# Patient Record
Sex: MALE | Race: WHITE | NOT HISPANIC OR LATINO
[De-identification: names, ages, dates, MRNs, and addresses within clinical notes are randomized per-mention and may not be internally consistent; named-entity substitution may affect disease eponyms.]

---

## 2019-01-16 ENCOUNTER — APPOINTMENT (OUTPATIENT)
Dept: INTERNAL MEDICINE | Facility: CLINIC | Age: 44
End: 2019-01-16

## 2019-06-15 ENCOUNTER — APPOINTMENT (OUTPATIENT)
Dept: FAMILY MEDICINE | Facility: CLINIC | Age: 44
End: 2019-06-15
Payer: COMMERCIAL

## 2019-06-15 ENCOUNTER — LABORATORY RESULT (OUTPATIENT)
Age: 44
End: 2019-06-15

## 2019-06-15 ENCOUNTER — NON-APPOINTMENT (OUTPATIENT)
Age: 44
End: 2019-06-15

## 2019-06-15 VITALS
DIASTOLIC BLOOD PRESSURE: 80 MMHG | SYSTOLIC BLOOD PRESSURE: 116 MMHG | HEART RATE: 72 BPM | WEIGHT: 205 LBS | BODY MASS INDEX: 26.31 KG/M2 | HEIGHT: 74 IN

## 2019-06-15 DIAGNOSIS — Z82.49 FAMILY HISTORY OF ISCHEMIC HEART DISEASE AND OTHER DISEASES OF THE CIRCULATORY SYSTEM: ICD-10-CM

## 2019-06-15 DIAGNOSIS — Z83.3 FAMILY HISTORY OF DIABETES MELLITUS: ICD-10-CM

## 2019-06-15 DIAGNOSIS — Z23 ENCOUNTER FOR IMMUNIZATION: ICD-10-CM

## 2019-06-15 DIAGNOSIS — Z86.79 PERSONAL HISTORY OF OTHER DISEASES OF THE CIRCULATORY SYSTEM: ICD-10-CM

## 2019-06-15 DIAGNOSIS — Z81.8 FAMILY HISTORY OF OTHER MENTAL AND BEHAVIORAL DISORDERS: ICD-10-CM

## 2019-06-15 DIAGNOSIS — Z83.438 FAMILY HISTORY OF OTHER DISORDER OF LIPOPROTEIN METABOLISM AND OTHER LIPIDEMIA: ICD-10-CM

## 2019-06-15 DIAGNOSIS — Z80.42 FAMILY HISTORY OF MALIGNANT NEOPLASM OF PROSTATE: ICD-10-CM

## 2019-06-15 DIAGNOSIS — Z81.1 FAMILY HISTORY OF ALCOHOL ABUSE AND DEPENDENCE: ICD-10-CM

## 2019-06-15 LAB
BILIRUB UR QL STRIP: NEGATIVE
CLARITY UR: CLEAR
GLUCOSE UR-MCNC: NEGATIVE
HCG UR QL: 0.2 EU/DL
HGB UR QL STRIP.AUTO: NEGATIVE
KETONES UR-MCNC: NEGATIVE
LEUKOCYTE ESTERASE UR QL STRIP: NEGATIVE
NITRITE UR QL STRIP: NEGATIVE
PH UR STRIP: 6
PROT UR STRIP-MCNC: NEGATIVE
SP GR UR STRIP: 1.01

## 2019-06-15 PROCEDURE — 99173 VISUAL ACUITY SCREEN: CPT

## 2019-06-15 PROCEDURE — 99386 PREV VISIT NEW AGE 40-64: CPT | Mod: 25

## 2019-06-15 PROCEDURE — 92551 PURE TONE HEARING TEST AIR: CPT

## 2019-06-15 PROCEDURE — 81002 URINALYSIS NONAUTO W/O SCOPE: CPT

## 2019-06-15 PROCEDURE — 93000 ELECTROCARDIOGRAM COMPLETE: CPT

## 2019-06-15 PROCEDURE — 90715 TDAP VACCINE 7 YRS/> IM: CPT

## 2019-06-15 PROCEDURE — 90471 IMMUNIZATION ADMIN: CPT

## 2019-06-15 PROCEDURE — 36415 COLL VENOUS BLD VENIPUNCTURE: CPT

## 2019-06-15 NOTE — PHYSICAL EXAM
[20/___] : left eye 20/[unfilled] [No Acute Distress] : no acute distress [Well Nourished] : well nourished [Well Developed] : well developed [Well-Appearing] : well-appearing [EOMI] : extraocular movements intact [Normal Sclera/Conjunctiva] : normal sclera/conjunctiva [PERRL] : pupils equal round and reactive to light [Normal Outer Ear/Nose] : the outer ears and nose were normal in appearance [Normal Oropharynx] : the oropharynx was normal [Supple] : supple [No JVD] : no jugular venous distention [Thyroid Normal, No Nodules] : the thyroid was normal and there were no nodules present [No Lymphadenopathy] : no lymphadenopathy [No Respiratory Distress] : no respiratory distress  [Clear to Auscultation] : lungs were clear to auscultation bilaterally [No Accessory Muscle Use] : no accessory muscle use [Regular Rhythm] : with a regular rhythm [Normal Rate] : normal rate  [Normal S1, S2] : normal S1 and S2 [No Murmur] : no murmur heard [No Carotid Bruits] : no carotid bruits [No Abdominal Bruit] : a ~M bruit was not heard ~T in the abdomen [No Varicosities] : no varicosities [Pedal Pulses Present] : the pedal pulses are present [No Extremity Clubbing/Cyanosis] : no extremity clubbing/cyanosis [No Edema] : there was no peripheral edema [Soft] : abdomen soft [No Palpable Aorta] : no palpable aorta [No Masses] : no abdominal mass palpated [Non-distended] : non-distended [No HSM] : no HSM [Non Tender] : non-tender [Normal Posterior Cervical Nodes] : no posterior cervical lymphadenopathy [Normal Anterior Cervical Nodes] : no anterior cervical lymphadenopathy [Normal Bowel Sounds] : normal bowel sounds [No Joint Swelling] : no joint swelling [No CVA Tenderness] : no CVA  tenderness [No Spinal Tenderness] : no spinal tenderness [Normal Gait] : normal gait [Grossly Normal Strength/Tone] : grossly normal strength/tone [No Rash] : no rash [Coordination Grossly Intact] : coordination grossly intact [No Focal Deficits] : no focal deficits [Normal Affect] : the affect was normal [Deep Tendon Reflexes (DTR)] : deep tendon reflexes were 2+ and symmetric [Normal Insight/Judgement] : insight and judgment were intact [de-identified] : 500 L 25 R 20  1000 R 20 L20   2000 R20  L20   4000  R15 L15

## 2019-06-15 NOTE — COUNSELING
[Weight management counseling provided] : Weight management [Healthy eating counseling provided] : healthy eating [Activity counseling provided] : activity [Behavioral health counseling provided] : behavioral health  [None] : None [Good understanding] : Patient has a good understanding of lifestyle changes and the steps needed to achieve self management goals [Fall prevention counseling provided] : fall prevention

## 2019-06-15 NOTE — HEALTH RISK ASSESSMENT
[Very Good] : ~his/her~ current health as very good [] : No [No falls in past year] : Patient reported no falls in the past year [0] : 2) Feeling down, depressed, or hopeless: Not at all (0) [HIV test declined] : HIV test declined [Change in mental status noted] : No change in mental status noted [Hepatitis C test declined] : Hepatitis C test declined [None] : None [With Family] : lives with family [Employed] : employed [# Of Children ___] : has [unfilled] children [] :  [Graduate School] : graduate school [Sexually Active] : sexually active [High Risk Behavior] : no high risk behavior [Feels Safe at Home] : Feels safe at home [Fully functional (bathing, dressing, toileting, transferring, walking, feeding)] : Fully functional (bathing, dressing, toileting, transferring, walking, feeding) [Reports changes in vision] : Reports no changes in vision [Reports changes in hearing] : Reports no changes in hearing [Fully functional (using the telephone, shopping, preparing meals, housekeeping, doing laundry, using] : Fully functional and needs no help or supervision to perform IADLs (using the telephone, shopping, preparing meals, housekeeping, doing laundry, using transportation, managing medications and managing finances) [Carbon Monoxide Detector] : carbon monoxide detector [Reports changes in dental health] : Reports no changes in dental health [Smoke Detector] : smoke detector [Guns at Home] : guns at home [Safety elements used in home] : safety elements used in home [Seat Belt] :  uses seat belt [Sunscreen] : uses sunscreen [de-identified] : locked

## 2019-06-16 LAB
ALBUMIN SERPL ELPH-MCNC: 4.6 G/DL
ALP BLD-CCNC: 58 U/L
ALT SERPL-CCNC: 25 U/L
ANION GAP SERPL CALC-SCNC: 15 MMOL/L
AST SERPL-CCNC: 20 U/L
BASOPHILS # BLD AUTO: 0.03 K/UL
BASOPHILS NFR BLD AUTO: 0.6 %
BILIRUB SERPL-MCNC: 1 MG/DL
BUN SERPL-MCNC: 16 MG/DL
CALCIUM SERPL-MCNC: 9.6 MG/DL
CHLORIDE SERPL-SCNC: 105 MMOL/L
CHOLEST SERPL-MCNC: 171 MG/DL
CHOLEST/HDLC SERPL: 3.4 RATIO
CO2 SERPL-SCNC: 22 MMOL/L
CREAT SERPL-MCNC: 1.05 MG/DL
EOSINOPHIL # BLD AUTO: 0.09 K/UL
EOSINOPHIL NFR BLD AUTO: 1.7 %
GLUCOSE SERPL-MCNC: 98 MG/DL
HCT VFR BLD CALC: 47.2 %
HDLC SERPL-MCNC: 50 MG/DL
HGB BLD-MCNC: 15.7 G/DL
IMM GRANULOCYTES NFR BLD AUTO: 0.4 %
LDLC SERPL CALC-MCNC: 107 MG/DL
LYMPHOCYTES # BLD AUTO: 1.64 K/UL
LYMPHOCYTES NFR BLD AUTO: 31.8 %
MAN DIFF?: NORMAL
MCHC RBC-ENTMCNC: 30.7 PG
MCHC RBC-ENTMCNC: 33.3 GM/DL
MCV RBC AUTO: 92.4 FL
MONOCYTES # BLD AUTO: 0.39 K/UL
MONOCYTES NFR BLD AUTO: 7.6 %
NEUTROPHILS # BLD AUTO: 2.99 K/UL
NEUTROPHILS NFR BLD AUTO: 57.9 %
PLATELET # BLD AUTO: 216 K/UL
POTASSIUM SERPL-SCNC: 5.3 MMOL/L
PROT SERPL-MCNC: 6.9 G/DL
RBC # BLD: 5.11 M/UL
RBC # FLD: 12.3 %
SODIUM SERPL-SCNC: 142 MMOL/L
T3FREE SERPL-MCNC: 2.68 PG/ML
T4 FREE SERPL-MCNC: 1.2 NG/DL
TRIGL SERPL-MCNC: 71 MG/DL
TSH SERPL-ACNC: 1.06 UIU/ML
WBC # FLD AUTO: 5.16 K/UL

## 2019-10-28 ENCOUNTER — RX RENEWAL (OUTPATIENT)
Age: 44
End: 2019-10-28

## 2020-02-16 ENCOUNTER — RX RENEWAL (OUTPATIENT)
Age: 45
End: 2020-02-16

## 2020-07-08 ENCOUNTER — EMERGENCY (EMERGENCY)
Facility: HOSPITAL | Age: 45
LOS: 0 days | Discharge: HOME | End: 2020-07-08
Payer: COMMERCIAL

## 2020-07-08 VITALS
TEMPERATURE: 99 F | OXYGEN SATURATION: 99 % | HEART RATE: 94 BPM | DIASTOLIC BLOOD PRESSURE: 84 MMHG | SYSTOLIC BLOOD PRESSURE: 130 MMHG | RESPIRATION RATE: 19 BRPM

## 2020-07-08 DIAGNOSIS — Z95.0 PRESENCE OF CARDIAC PACEMAKER: ICD-10-CM

## 2020-07-08 DIAGNOSIS — Z11.59 ENCOUNTER FOR SCREENING FOR OTHER VIRAL DISEASES: ICD-10-CM

## 2020-07-08 PROCEDURE — 99283 EMERGENCY DEPT VISIT LOW MDM: CPT

## 2020-07-08 PROCEDURE — U0003: CPT

## 2020-07-08 NOTE — ED STATDOCS - OBJECTIVE STATEMENT
44 yo male with a PMH of PPM presents for covid testing. Pt states his parents are coming over and his daughter was sick last week with a fever. Pt currently asymptomatic.

## 2020-07-08 NOTE — ED STATDOCS - PATIENT PORTAL LINK FT
You can access the FollowMyHealth Patient Portal offered by [facility] by registering at the following website: [email or web address]. By joining [employer]’s FollowMyHealth portal, you will also be able to view your health information using other applications (apps) compatible with our system.

## 2020-07-09 LAB — SARS-COV-2 RNA SPEC QL NAA+PROBE: SIGNIFICANT CHANGE UP

## 2020-07-18 ENCOUNTER — EMERGENCY (EMERGENCY)
Facility: HOSPITAL | Age: 45
LOS: 0 days | Discharge: HOME | End: 2020-07-18
Payer: COMMERCIAL

## 2020-07-18 VITALS
OXYGEN SATURATION: 98 % | RESPIRATION RATE: 13 BRPM | DIASTOLIC BLOOD PRESSURE: 69 MMHG | SYSTOLIC BLOOD PRESSURE: 117 MMHG | TEMPERATURE: 99 F | HEART RATE: 52 BPM

## 2020-07-18 DIAGNOSIS — J02.9 ACUTE PHARYNGITIS, UNSPECIFIED: ICD-10-CM

## 2020-07-18 PROCEDURE — 99283 EMERGENCY DEPT VISIT LOW MDM: CPT

## 2020-07-18 PROCEDURE — U0003: CPT

## 2020-07-18 NOTE — ED STATDOCS - PATIENT PORTAL LINK FT
You can access the FollowMyHealth Patient Portal offered by [facility] by registering at the following website: [email or web address]. By joining [employer]’s FollowMyHealth portal, you will also be able to view your health information using other applications (apps) compatible with our system.

## 2020-07-18 NOTE — ED STATDOCS - NSFOLLOWUPINSTRUCTIONS_ED_ALL_ED_FT
Pt here for COVID-19 testing. Pt non toxic. Pt was swabbed for COVID-19 in their car in drive through area. [facility] [employer] will call pt with results. return precautions given. Home self-quarantine recommended. Pt agrees with plan of  care.

## 2020-07-19 LAB — SARS-COV-2 RNA SPEC QL NAA+PROBE: SIGNIFICANT CHANGE UP

## 2020-09-14 ENCOUNTER — EMERGENCY (EMERGENCY)
Facility: HOSPITAL | Age: 45
LOS: 0 days | Discharge: HOME | End: 2020-09-14
Payer: COMMERCIAL

## 2020-09-14 VITALS
SYSTOLIC BLOOD PRESSURE: 120 MMHG | OXYGEN SATURATION: 99 % | HEART RATE: 50 BPM | RESPIRATION RATE: 15 BRPM | DIASTOLIC BLOOD PRESSURE: 73 MMHG | TEMPERATURE: 98 F

## 2020-09-14 DIAGNOSIS — Z20.828 CONTACT WITH AND (SUSPECTED) EXPOSURE TO OTHER VIRAL COMMUNICABLE DISEASES: ICD-10-CM

## 2020-09-14 PROCEDURE — 99283 EMERGENCY DEPT VISIT LOW MDM: CPT

## 2020-09-14 PROCEDURE — U0003: CPT

## 2020-09-14 NOTE — ED STATDOCS - PROGRESS NOTE DETAILS
How to get your Coronavirus (COVID-19) Testing Results:   Please be advised that you were tested for the coronavirus (COVID-19) in the Emergency Department at [facility].  You are to maintain self-quarantine procedures for 14 days until instructed otherwise by one of our healthcare agents. Please note that the test may take up to 2-4 days to result.  If you do not hear from us within 72 hours and you'd like to check on your results, you can call on of our coronavirus specialists at [address] [place] [address] (available 24/7).  Please DO NOT call the site where you received the test to obtain your results. ~Pancho Encarnacion PA-C

## 2020-09-14 NOTE — ED STATDOCS - PHYSICAL EXAMINATION
PA note: Constitutional: NAD AAOx3  Eyes: EOMI, pupils equal  Head: Normocephalic, atraumatic  ENT: Throat is clear without erythema. No exudates. Airway is patent.  Mouth: no airway obstruction.   Cardiac: S1 S2. regular rate   Resp: Non-labored breathing, no tachypnea. Lungs CTA b/l. No w/r/r. Equal chest expansion and rise. O2sat 100% RA  GI: Abd soft. NT/ND.   Neuro: CN2-12 intact. No focal deficits.   Skin: No rashes  ~Pancho Encarnacion PA-C

## 2020-09-14 NOTE — ED STATDOCS - OBJECTIVE STATEMENT
Patient presents to [facility] for screening for COVID-19 for traveling purposes. Patient has no acute S&S of fever, chills, cough, SOB, DEVRIES, n/v/d. Patient may have been exposed to COVID-19. ~Pancho Encarnacion PA-C.

## 2020-09-14 NOTE — ED STATDOCS - CLINICAL SUMMARY MEDICAL DECISION MAKING FREE TEXT BOX
Patient presents with concerns for COVID exposure.  As patient is nontoxic appearing, will test for COVID and d/c.  Quarantine reviewed and return precautions reviewed. ~Pancho Encarnacion PA-C

## 2020-09-14 NOTE — ED STATDOCS - NS ED ROS FT
ROS: Constitutional- DENIES fever, chills.  Respiratory- DENIES cough, SOB  Cardiac- no chest pain, no palpitations, ENT- DENIES rhinorrhea, no sore throat, no congestion. DENIES loss of sense of smell or taste. Abdomen- No nausea, no vomiting, no diarrhea.  Urinary- no dysuria, no urgency, no frequency.  Skin- No rashes ~Pancho Encarnacion PA-C

## 2020-09-14 NOTE — ED STATDOCS - PATIENT PORTAL LINK FT
You can access the FollowMyHealth Patient Portal offered by [facility] by registering at the following website: [email or web address]. By joining [employer]’s FollowMyHealth portal, you will also be able to view your health information using other applications (apps) compatible with our system.

## 2020-09-15 ENCOUNTER — TRANSCRIPTION ENCOUNTER (OUTPATIENT)
Age: 45
End: 2020-09-15

## 2020-09-15 LAB — SARS-COV-2 RNA SPEC QL NAA+PROBE: SIGNIFICANT CHANGE UP

## 2020-09-18 ENCOUNTER — RX RENEWAL (OUTPATIENT)
Age: 45
End: 2020-09-18

## 2020-11-23 ENCOUNTER — LABORATORY RESULT (OUTPATIENT)
Age: 45
End: 2020-11-23

## 2020-11-30 ENCOUNTER — APPOINTMENT (OUTPATIENT)
Dept: FAMILY MEDICINE | Facility: CLINIC | Age: 45
End: 2020-11-30
Payer: COMMERCIAL

## 2020-11-30 ENCOUNTER — NON-APPOINTMENT (OUTPATIENT)
Age: 45
End: 2020-11-30

## 2020-11-30 VITALS
TEMPERATURE: 98 F | BODY MASS INDEX: 26.95 KG/M2 | SYSTOLIC BLOOD PRESSURE: 110 MMHG | HEIGHT: 74 IN | HEART RATE: 43 BPM | DIASTOLIC BLOOD PRESSURE: 64 MMHG | WEIGHT: 210 LBS | OXYGEN SATURATION: 98 %

## 2020-11-30 DIAGNOSIS — Z23 ENCOUNTER FOR IMMUNIZATION: ICD-10-CM

## 2020-11-30 DIAGNOSIS — Z11.59 ENCOUNTER FOR SCREENING FOR OTHER VIRAL DISEASES: ICD-10-CM

## 2020-11-30 LAB
BILIRUB UR QL STRIP: NORMAL
CLARITY UR: CLEAR
COLLECTION METHOD: NORMAL
GLUCOSE UR-MCNC: NORMAL
HCG UR QL: 0.2 EU/DL
HGB UR QL STRIP.AUTO: NORMAL
KETONES UR-MCNC: NORMAL
LEUKOCYTE ESTERASE UR QL STRIP: NORMAL
NITRITE UR QL STRIP: NORMAL
PH UR STRIP: 6
PROT UR STRIP-MCNC: NORMAL
SP GR UR STRIP: 1.01

## 2020-11-30 PROCEDURE — G0008: CPT

## 2020-11-30 PROCEDURE — 81003 URINALYSIS AUTO W/O SCOPE: CPT | Mod: QW

## 2020-11-30 PROCEDURE — 99396 PREV VISIT EST AGE 40-64: CPT | Mod: 25

## 2020-11-30 PROCEDURE — 99072 ADDL SUPL MATRL&STAF TM PHE: CPT

## 2020-11-30 PROCEDURE — 36415 COLL VENOUS BLD VENIPUNCTURE: CPT

## 2020-11-30 PROCEDURE — 90686 IIV4 VACC NO PRSV 0.5 ML IM: CPT

## 2020-11-30 NOTE — HEALTH RISK ASSESSMENT
[Yes] : Yes [2 - 3 times a week (3 pts)] : 2 - 3  times a week (3 points) [1 or 2 (0 pts)] : 1 or 2 (0 points) [No] : In the past 12 months have you used drugs other than those required for medical reasons? No [0] : 2) Feeling down, depressed, or hopeless: Not at all (0) [Very Good] : ~his/her~  mood as very good [] : No [Never (0 pts)] : Never (0 points) [No falls in past year] : Patient reported no falls in the past year [Audit-CScore] : 3 [[id number]] : 0 [HIV test declined] : HIV test declined [Hepatitis C test declined] : Hepatitis C test declined [Change in mental status noted] : No change in mental status noted [Language] : denies difficulty with language [Behavior] : denies difficulty with behavior [Learning/Retaining New Information] : denies difficulty learning/retaining new information [Handling Complex Tasks] : denies difficulty handling complex tasks [Reasoning] : denies difficulty with reasoning [Spatial Ability and Orientation] : denies difficulty with spatial ability and orientation [None] : None [Alone] : lives alone [Employed] : employed [Graduate School] : graduate school [] :  [# Of Children ___] : has [unfilled] children [Sexually Active] : sexually active [High Risk Behavior] : no high risk behavior [Feels Safe at Home] : Feels safe at home [Fully functional (bathing, dressing, toileting, transferring, walking, feeding)] : Fully functional (bathing, dressing, toileting, transferring, walking, feeding) [Fully functional (using the telephone, shopping, preparing meals, housekeeping, doing laundry, using] : Fully functional and needs no help or supervision to perform IADLs (using the telephone, shopping, preparing meals, housekeeping, doing laundry, using transportation, managing medications and managing finances) [Reports changes in hearing] : Reports no changes in hearing [Reports changes in vision] : Reports no changes in vision [Reports changes in dental health] : Reports no changes in dental health [Smoke Detector] : smoke detector [Carbon Monoxide Detector] : carbon monoxide detector [Guns at Home] : no guns at home [Seat Belt] :  uses seat belt [Sunscreen] : uses sunscreen

## 2020-11-30 NOTE — PHYSICAL EXAM
[20/___] : left eye 20/[unfilled] [Normal] : soft, non-tender, non-distended, no masses palpated, no HSM and normal bowel sounds [FreeTextEntry1] : Right Ear\par \par 0.5-40\par 1-25\par 2-15\par 4-15\par \par Left Ear\par \par 0.5-30\par 1-15\par 2-15\par 4-15

## 2020-12-02 ENCOUNTER — TRANSCRIPTION ENCOUNTER (OUTPATIENT)
Age: 45
End: 2020-12-02

## 2020-12-02 LAB
SARS-COV-2 IGG SERPL IA-ACNC: 135 INDEX
SARS-COV-2 IGG SERPL QL IA: POSITIVE

## 2021-01-13 ENCOUNTER — RX RENEWAL (OUTPATIENT)
Age: 46
End: 2021-01-13

## 2021-03-01 ENCOUNTER — APPOINTMENT (OUTPATIENT)
Dept: FAMILY MEDICINE | Facility: CLINIC | Age: 46
End: 2021-03-01

## 2021-03-23 ENCOUNTER — NON-APPOINTMENT (OUTPATIENT)
Age: 46
End: 2021-03-23

## 2021-07-09 ENCOUNTER — RX RENEWAL (OUTPATIENT)
Age: 46
End: 2021-07-09

## 2021-07-09 RX ORDER — ASPIRIN 81 MG/1
81 TABLET, COATED ORAL
Qty: 100 | Refills: 0 | Status: ACTIVE | COMMUNITY
Start: 2020-09-18 | End: 1900-01-01

## 2021-08-11 ENCOUNTER — NON-APPOINTMENT (OUTPATIENT)
Age: 46
End: 2021-08-11

## 2021-08-11 ENCOUNTER — APPOINTMENT (OUTPATIENT)
Dept: FAMILY MEDICINE | Facility: CLINIC | Age: 46
End: 2021-08-11
Payer: COMMERCIAL

## 2021-08-11 VITALS
TEMPERATURE: 97.9 F | HEIGHT: 74 IN | OXYGEN SATURATION: 98 % | BODY MASS INDEX: 26.95 KG/M2 | SYSTOLIC BLOOD PRESSURE: 153 MMHG | WEIGHT: 210 LBS | DIASTOLIC BLOOD PRESSURE: 84 MMHG | HEART RATE: 50 BPM

## 2021-08-11 DIAGNOSIS — Z95.0 PRESENCE OF CARDIAC PACEMAKER: ICD-10-CM

## 2021-08-11 PROCEDURE — 99213 OFFICE O/P EST LOW 20 MIN: CPT | Mod: 25

## 2021-08-11 PROCEDURE — 93000 ELECTROCARDIOGRAM COMPLETE: CPT

## 2021-08-11 RX ORDER — METOPROLOL SUCCINATE 25 MG/1
25 TABLET, EXTENDED RELEASE ORAL DAILY
Qty: 90 | Refills: 0 | Status: ACTIVE | COMMUNITY
Start: 2019-06-15

## 2021-08-11 NOTE — COUNSELING
[Fall prevention counseling provided] : Fall prevention counseling provided [Behavioral health counseling provided] : Behavioral health counseling provided English

## 2021-08-11 NOTE — HEALTH RISK ASSESSMENT
[Yes] : Yes [Monthly or less (1 pt)] : Monthly or less (1 point) [1 or 2 (0 pts)] : 1 or 2 (0 points) [Never (0 pts)] : Never (0 points) [No] : In the past 12 months have you used drugs other than those required for medical reasons? No [No falls in past year] : Patient reported no falls in the past year [0] : 2) Feeling down, depressed, or hopeless: Not at all (0) [] : No [Audit-CScore] : 1 [[id number]] : 0

## 2021-12-03 ENCOUNTER — APPOINTMENT (OUTPATIENT)
Dept: FAMILY MEDICINE | Facility: CLINIC | Age: 46
End: 2021-12-03

## 2022-04-11 PROBLEM — Z11.59 SCREENING FOR VIRAL DISEASE: Status: ACTIVE | Noted: 2020-11-30

## 2022-11-19 ENCOUNTER — APPOINTMENT (OUTPATIENT)
Dept: FAMILY MEDICINE | Facility: CLINIC | Age: 47
End: 2022-11-19

## 2022-11-19 ENCOUNTER — NON-APPOINTMENT (OUTPATIENT)
Age: 47
End: 2022-11-19

## 2022-11-19 VITALS
WEIGHT: 204 LBS | SYSTOLIC BLOOD PRESSURE: 110 MMHG | OXYGEN SATURATION: 98 % | HEART RATE: 51 BPM | BODY MASS INDEX: 26.18 KG/M2 | HEIGHT: 74 IN | TEMPERATURE: 97.7 F | DIASTOLIC BLOOD PRESSURE: 80 MMHG

## 2022-11-19 DIAGNOSIS — I48.91 UNSPECIFIED ATRIAL FIBRILLATION: ICD-10-CM

## 2022-11-19 DIAGNOSIS — Z00.00 ENCOUNTER FOR GENERAL ADULT MEDICAL EXAMINATION W/OUT ABNORMAL FINDINGS: ICD-10-CM

## 2022-11-19 LAB
BILIRUB UR QL STRIP: NORMAL
CLARITY UR: CLEAR
COLLECTION METHOD: NORMAL
GLUCOSE UR-MCNC: NORMAL
HCG UR QL: 0.2 EU/DL
HGB UR QL STRIP.AUTO: NORMAL
KETONES UR-MCNC: NORMAL
LEUKOCYTE ESTERASE UR QL STRIP: NORMAL
NITRITE UR QL STRIP: NORMAL
PH UR STRIP: 6
PROT UR STRIP-MCNC: NORMAL
SP GR UR STRIP: 1.02

## 2022-11-19 PROCEDURE — 92551 PURE TONE HEARING TEST AIR: CPT

## 2022-11-19 PROCEDURE — 99396 PREV VISIT EST AGE 40-64: CPT | Mod: 25

## 2022-11-19 PROCEDURE — 36415 COLL VENOUS BLD VENIPUNCTURE: CPT

## 2022-11-19 PROCEDURE — 81003 URINALYSIS AUTO W/O SCOPE: CPT | Mod: QW

## 2022-11-19 PROCEDURE — 99173 VISUAL ACUITY SCREEN: CPT

## 2022-11-19 PROCEDURE — 93000 ELECTROCARDIOGRAM COMPLETE: CPT

## 2022-11-19 RX ORDER — ASPIRIN ENTERIC COATED TABLETS 81 MG 81 MG/1
81 TABLET, DELAYED RELEASE ORAL
Qty: 100 | Refills: 0 | Status: DISCONTINUED | COMMUNITY
Start: 2019-10-28 | End: 2022-11-19

## 2022-11-19 NOTE — HEALTH RISK ASSESSMENT
[Never] : Never [Yes] : Yes [2 - 3 times a week (3 pts)] : 2 - 3  times a week (3 points) [1 or 2 (0 pts)] : 1 or 2 (0 points) [Never (0 pts)] : Never (0 points) [No] : In the past 12 months have you used drugs other than those required for medical reasons? No [No falls in past year] : Patient reported no falls in the past year [0] : 2) Feeling down, depressed, or hopeless: Not at all (0) [PHQ-2 Negative - No further assessment needed] : PHQ-2 Negative - No further assessment needed [HIV test declined] : HIV test declined [Hepatitis C test declined] : Hepatitis C test declined [With Family] : lives with family [# of Members in Household ___] :  household currently consist of [unfilled] member(s) [Employed] : employed [Graduate School] : graduate school [# Of Children ___] : has [unfilled] children [] :  [Feels Safe at Home] : Feels safe at home [Sexually Active] : sexually active [Fully functional (bathing, dressing, toileting, transferring, walking, feeding)] : Fully functional (bathing, dressing, toileting, transferring, walking, feeding) [Fully functional (using the telephone, shopping, preparing meals, housekeeping, doing laundry, using] : Fully functional and needs no help or supervision to perform IADLs (using the telephone, shopping, preparing meals, housekeeping, doing laundry, using transportation, managing medications and managing finances) [Reports changes in vision] : Reports changes in vision [Reports normal functional visual acuity (ie: able to read med bottle)] : Reports normal functional visual acuity [Smoke Detector] : smoke detector [Carbon Monoxide Detector] : carbon monoxide detector [Guns at Home] : guns at home [Safety elements used in home] : safety elements used in home [Seat Belt] :  uses seat belt [Sunscreen] : uses sunscreen [Audit-CScore] : 3 [[id number]] : 0 [Change in mental status noted] : No change in mental status noted [Language] : denies difficulty with language [Behavior] : denies difficulty with behavior [Learning/Retaining New Information] : denies difficulty learning/retaining new information [Handling Complex Tasks] : denies difficulty handling complex tasks [Reasoning] : denies difficulty with reasoning [Spatial Ability and Orientation] : denies difficulty with spatial ability and orientation [Reports changes in hearing] : Reports no changes in hearing [Reports changes in dental health] : Reports no changes in dental health [Travel to Developing Areas] : does not  travel to developing areas [TB Exposure] : is not being exposed to tuberculosis [Caregiver Concerns] : does not have caregiver concerns [FreeTextEntry2] : teacher

## 2022-11-19 NOTE — PHYSICAL EXAM
[20/___] : left eye 20/[unfilled] [Normal] : no joint swelling and grossly normal strength and tone [FreeTextEntry1] : Right Ear\par \par 0.5-40\par 1-25\par 2-20\par 4-20\par \par Left Ear\par \par 0.5-25\par 1-20\par 2-20\par 4-20\par

## 2022-11-20 LAB
ALBUMIN SERPL ELPH-MCNC: 4.5 G/DL
ALP BLD-CCNC: 67 U/L
ALT SERPL-CCNC: 16 U/L
ANION GAP SERPL CALC-SCNC: 10 MMOL/L
AST SERPL-CCNC: 16 U/L
BASOPHILS # BLD AUTO: 0.04 K/UL
BASOPHILS NFR BLD AUTO: 0.9 %
BILIRUB SERPL-MCNC: 0.9 MG/DL
BUN SERPL-MCNC: 15 MG/DL
CALCIUM SERPL-MCNC: 9.6 MG/DL
CHLORIDE SERPL-SCNC: 105 MMOL/L
CO2 SERPL-SCNC: 27 MMOL/L
CREAT SERPL-MCNC: 0.96 MG/DL
EGFR: 99 ML/MIN/1.73M2
EOSINOPHIL # BLD AUTO: 0.08 K/UL
EOSINOPHIL NFR BLD AUTO: 1.9 %
GLUCOSE SERPL-MCNC: 100 MG/DL
HCT VFR BLD CALC: 46.6 %
HGB BLD-MCNC: 15.7 G/DL
IMM GRANULOCYTES NFR BLD AUTO: 0.5 %
LYMPHOCYTES # BLD AUTO: 1.32 K/UL
LYMPHOCYTES NFR BLD AUTO: 30.6 %
MAN DIFF?: NORMAL
MCHC RBC-ENTMCNC: 30.4 PG
MCHC RBC-ENTMCNC: 33.7 GM/DL
MCV RBC AUTO: 90.1 FL
MONOCYTES # BLD AUTO: 0.29 K/UL
MONOCYTES NFR BLD AUTO: 6.7 %
NEUTROPHILS # BLD AUTO: 2.56 K/UL
NEUTROPHILS NFR BLD AUTO: 59.4 %
PLATELET # BLD AUTO: 232 K/UL
POTASSIUM SERPL-SCNC: 4.9 MMOL/L
PROT SERPL-MCNC: 7 G/DL
RBC # BLD: 5.17 M/UL
RBC # FLD: 12.1 %
SODIUM SERPL-SCNC: 141 MMOL/L
T3FREE SERPL-MCNC: 2.94 PG/ML
T4 FREE SERPL-MCNC: 1.2 NG/DL
TSH SERPL-ACNC: 1.29 UIU/ML
WBC # FLD AUTO: 4.31 K/UL

## 2022-12-13 ENCOUNTER — TRANSCRIPTION ENCOUNTER (OUTPATIENT)
Age: 47
End: 2022-12-13

## 2022-12-13 LAB
CHOLEST SERPL-MCNC: 204 MG/DL
HDLC SERPL-MCNC: 52 MG/DL
LDLC SERPL CALC-MCNC: 134 MG/DL
NONHDLC SERPL-MCNC: 152 MG/DL
TRIGL SERPL-MCNC: 88 MG/DL

## 2022-12-29 ENCOUNTER — NON-APPOINTMENT (OUTPATIENT)
Age: 47
End: 2022-12-29

## 2023-04-02 ENCOUNTER — TRANSCRIPTION ENCOUNTER (OUTPATIENT)
Age: 48
End: 2023-04-02

## 2023-04-02 ENCOUNTER — INPATIENT (INPATIENT)
Facility: HOSPITAL | Age: 48
LOS: 0 days | Discharge: HOME | DRG: 313 | End: 2023-04-03
Attending: FAMILY MEDICINE | Admitting: FAMILY MEDICINE
Payer: COMMERCIAL

## 2023-04-02 VITALS
SYSTOLIC BLOOD PRESSURE: 165 MMHG | OXYGEN SATURATION: 99 % | RESPIRATION RATE: 18 BRPM | DIASTOLIC BLOOD PRESSURE: 82 MMHG | TEMPERATURE: 98 F | HEART RATE: 50 BPM

## 2023-04-02 DIAGNOSIS — I20.0 UNSTABLE ANGINA: ICD-10-CM

## 2023-04-02 LAB
ALBUMIN SERPL ELPH-MCNC: 3.9 G/DL — SIGNIFICANT CHANGE UP (ref 3.3–5)
ALP SERPL-CCNC: 69 U/L — SIGNIFICANT CHANGE UP (ref 40–120)
ALT FLD-CCNC: 29 U/L — SIGNIFICANT CHANGE UP (ref 12–78)
ANION GAP SERPL CALC-SCNC: 3 MMOL/L — LOW (ref 5–17)
AST SERPL-CCNC: 20 U/L — SIGNIFICANT CHANGE UP (ref 15–37)
BASOPHILS # BLD AUTO: 0.04 K/UL — SIGNIFICANT CHANGE UP (ref 0–0.2)
BASOPHILS NFR BLD AUTO: 0.6 % — SIGNIFICANT CHANGE UP (ref 0–2)
BILIRUB SERPL-MCNC: 0.5 MG/DL — SIGNIFICANT CHANGE UP (ref 0.2–1.2)
BUN SERPL-MCNC: 17 MG/DL — SIGNIFICANT CHANGE UP (ref 7–23)
CALCIUM SERPL-MCNC: 8.7 MG/DL — SIGNIFICANT CHANGE UP (ref 8.5–10.1)
CHLORIDE SERPL-SCNC: 110 MMOL/L — HIGH (ref 96–108)
CO2 SERPL-SCNC: 28 MMOL/L — SIGNIFICANT CHANGE UP (ref 22–31)
CREAT SERPL-MCNC: 0.98 MG/DL — SIGNIFICANT CHANGE UP (ref 0.5–1.3)
D DIMER BLD IA.RAPID-MCNC: <150 NG/ML DDU — SIGNIFICANT CHANGE UP
EGFR: 96 ML/MIN/1.73M2 — SIGNIFICANT CHANGE UP
EOSINOPHIL # BLD AUTO: 0.12 K/UL — SIGNIFICANT CHANGE UP (ref 0–0.5)
EOSINOPHIL NFR BLD AUTO: 1.8 % — SIGNIFICANT CHANGE UP (ref 0–6)
FLUAV AG NPH QL: SIGNIFICANT CHANGE UP
FLUBV AG NPH QL: SIGNIFICANT CHANGE UP
GLUCOSE SERPL-MCNC: 105 MG/DL — HIGH (ref 70–99)
HCT VFR BLD CALC: 45.2 % — SIGNIFICANT CHANGE UP (ref 39–50)
HGB BLD-MCNC: 15.5 G/DL — SIGNIFICANT CHANGE UP (ref 13–17)
IMM GRANULOCYTES NFR BLD AUTO: 0.3 % — SIGNIFICANT CHANGE UP (ref 0–0.9)
LYMPHOCYTES # BLD AUTO: 1.78 K/UL — SIGNIFICANT CHANGE UP (ref 1–3.3)
LYMPHOCYTES # BLD AUTO: 26.9 % — SIGNIFICANT CHANGE UP (ref 13–44)
MAGNESIUM SERPL-MCNC: 2.1 MG/DL — SIGNIFICANT CHANGE UP (ref 1.6–2.6)
MCHC RBC-ENTMCNC: 30.5 PG — SIGNIFICANT CHANGE UP (ref 27–34)
MCHC RBC-ENTMCNC: 34.3 GM/DL — SIGNIFICANT CHANGE UP (ref 32–36)
MCV RBC AUTO: 88.8 FL — SIGNIFICANT CHANGE UP (ref 80–100)
MONOCYTES # BLD AUTO: 0.42 K/UL — SIGNIFICANT CHANGE UP (ref 0–0.9)
MONOCYTES NFR BLD AUTO: 6.3 % — SIGNIFICANT CHANGE UP (ref 2–14)
NEUTROPHILS # BLD AUTO: 4.24 K/UL — SIGNIFICANT CHANGE UP (ref 1.8–7.4)
NEUTROPHILS NFR BLD AUTO: 64.1 % — SIGNIFICANT CHANGE UP (ref 43–77)
NT-PROBNP SERPL-SCNC: 206 PG/ML — HIGH (ref 0–125)
PLATELET # BLD AUTO: 231 K/UL — SIGNIFICANT CHANGE UP (ref 150–400)
POTASSIUM SERPL-MCNC: 3.9 MMOL/L — SIGNIFICANT CHANGE UP (ref 3.5–5.3)
POTASSIUM SERPL-SCNC: 3.9 MMOL/L — SIGNIFICANT CHANGE UP (ref 3.5–5.3)
PROT SERPL-MCNC: 7.5 GM/DL — SIGNIFICANT CHANGE UP (ref 6–8.3)
RBC # BLD: 5.09 M/UL — SIGNIFICANT CHANGE UP (ref 4.2–5.8)
RBC # FLD: 12.6 % — SIGNIFICANT CHANGE UP (ref 10.3–14.5)
RSV RNA NPH QL NAA+NON-PROBE: SIGNIFICANT CHANGE UP
SARS-COV-2 RNA SPEC QL NAA+PROBE: SIGNIFICANT CHANGE UP
SODIUM SERPL-SCNC: 141 MMOL/L — SIGNIFICANT CHANGE UP (ref 135–145)
TROPONIN I, HIGH SENSITIVITY RESULT: 6.35 NG/L — SIGNIFICANT CHANGE UP
TROPONIN I, HIGH SENSITIVITY RESULT: 6.59 NG/L — SIGNIFICANT CHANGE UP
WBC # BLD: 6.62 K/UL — SIGNIFICANT CHANGE UP (ref 3.8–10.5)
WBC # FLD AUTO: 6.62 K/UL — SIGNIFICANT CHANGE UP (ref 3.8–10.5)

## 2023-04-02 PROCEDURE — 93005 ELECTROCARDIOGRAM TRACING: CPT

## 2023-04-02 PROCEDURE — 71045 X-RAY EXAM CHEST 1 VIEW: CPT | Mod: 26

## 2023-04-02 PROCEDURE — 84484 ASSAY OF TROPONIN QUANT: CPT

## 2023-04-02 PROCEDURE — 80061 LIPID PANEL: CPT

## 2023-04-02 PROCEDURE — 99285 EMERGENCY DEPT VISIT HI MDM: CPT

## 2023-04-02 PROCEDURE — 93306 TTE W/DOPPLER COMPLETE: CPT

## 2023-04-02 PROCEDURE — 99223 1ST HOSP IP/OBS HIGH 75: CPT

## 2023-04-02 PROCEDURE — 36415 COLL VENOUS BLD VENIPUNCTURE: CPT

## 2023-04-02 PROCEDURE — 93010 ELECTROCARDIOGRAM REPORT: CPT

## 2023-04-02 PROCEDURE — 71045 X-RAY EXAM CHEST 1 VIEW: CPT

## 2023-04-02 RX ORDER — ASPIRIN/CALCIUM CARB/MAGNESIUM 324 MG
1 TABLET ORAL
Refills: 0 | DISCHARGE

## 2023-04-02 RX ORDER — ASPIRIN/CALCIUM CARB/MAGNESIUM 324 MG
81 TABLET ORAL DAILY
Refills: 0 | Status: DISCONTINUED | OUTPATIENT
Start: 2023-04-02 | End: 2023-04-03

## 2023-04-02 RX ORDER — ATORVASTATIN CALCIUM 80 MG/1
20 TABLET, FILM COATED ORAL AT BEDTIME
Refills: 0 | Status: DISCONTINUED | OUTPATIENT
Start: 2023-04-02 | End: 2023-04-03

## 2023-04-02 RX ORDER — ACETAMINOPHEN 500 MG
650 TABLET ORAL EVERY 6 HOURS
Refills: 0 | Status: DISCONTINUED | OUTPATIENT
Start: 2023-04-02 | End: 2023-04-03

## 2023-04-02 RX ORDER — METOPROLOL TARTRATE 50 MG
25 TABLET ORAL DAILY
Refills: 0 | Status: DISCONTINUED | OUTPATIENT
Start: 2023-04-02 | End: 2023-04-03

## 2023-04-02 RX ORDER — ONDANSETRON 8 MG/1
4 TABLET, FILM COATED ORAL EVERY 8 HOURS
Refills: 0 | Status: DISCONTINUED | OUTPATIENT
Start: 2023-04-02 | End: 2023-04-03

## 2023-04-02 RX ORDER — LANOLIN ALCOHOL/MO/W.PET/CERES
3 CREAM (GRAM) TOPICAL AT BEDTIME
Refills: 0 | Status: DISCONTINUED | OUTPATIENT
Start: 2023-04-02 | End: 2023-04-03

## 2023-04-02 RX ORDER — METOPROLOL TARTRATE 50 MG
1 TABLET ORAL
Refills: 0 | DISCHARGE

## 2023-04-02 RX ORDER — ASPIRIN/CALCIUM CARB/MAGNESIUM 324 MG
325 TABLET ORAL ONCE
Refills: 0 | Status: COMPLETED | OUTPATIENT
Start: 2023-04-02 | End: 2023-04-02

## 2023-04-02 RX ADMIN — ATORVASTATIN CALCIUM 20 MILLIGRAM(S): 80 TABLET, FILM COATED ORAL at 22:17

## 2023-04-02 NOTE — ED PROVIDER NOTE - NS_ ATTENDINGSCRIBEDETAILS _ED_A_ED_FT
I Hayden Navarro MD saw and examined the patient. Scribe documented for me and under my supervision. I have modified the scribe's documentation where necessary to reflect my history, physical exam and other relevant documentations pertinent to the care of the patient.

## 2023-04-02 NOTE — ED ADULT TRIAGE NOTE - CHIEF COMPLAINT QUOTE
pt ambulatory to triage from  c/o chest pain since friday. pmh PM, afib, cardiac ablasions. -allergies

## 2023-04-02 NOTE — H&P ADULT - ASSESSMENT
49 y/o male with PMHx of PM, Afib/atrial flutter, cardiac ablations x 2, sick sinus syndrome, s/p pacemaker placement presents to the ED from  c/o intermittent chest discomfort and left arm numbness/tingling x2 days    Assessment:  Chest Discomfort with EKG changes - R/o ACS  SSS  H/o Pacemaker  Bradycardia    Plan:  Admit to telemetry  Serial CE's and EKG's  Cardiology consult  Add statin   Continue beta blocker  Continue ASA  Bedrest until am, then ambulate if CE's negative  May need stress testing

## 2023-04-02 NOTE — ED PROVIDER NOTE - PROGRESS NOTE DETAILS
Matti Navarro: Spoke with Dr. Bryce Villaseñor, patient's PCP. Dr. Villaseñor is agreeable with admission. Spoke with cardiologist Dr. Joshi who is agreeable with troponin evaluation, aspirin, and reassessment. Matti Navarro: Spoke with Dr. Bryce Villaseñor, patient's PCP. Dr. Villaseñor is agreeable with admission. Spoke with cardiologist Dr. Joshi who is agreeable with troponin evaluation, aspirin, and reassessment. Patient with abnormal EKG, with ST depression in pericordial leads.

## 2023-04-02 NOTE — H&P ADULT - HISTORY OF PRESENT ILLNESS
47 y/o male with PMHx of PM, Afib/atrial flutter, cardiac ablations x 2, sick sinus syndrome, s/p pacemaker placement presents to the ED from  c/o intermittent chest discomfort and left arm numbness/tingling x2 days. On DOA after watching roller coaster videos, symptoms recurred and felt he should get it checked out, since he planned vacation to [place] the next day and wanted to be sure it was safe. He regularly sees his cardiologist at [facility],  Dr. 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 No allergies to medications. Denies fevers, chills, lightheadedness, neck pain., sweats, or SOB. Not as regular in taking his medication.      Active Problems  Encounter for preventive health examination (V70.0) (Z00.00)  Afib (427.31) (I48.91)  Need for immunization against influenza (V04.81) (Z23)  Pacemaker (V45.01) (Z95.0)  Screening for viral disease (V73.99) (Z11.59)  Tetanus-diphtheria (Td) vaccination (V06.5) (Z23)    Past Medical History  History of atrial flutter (V12.59) (Z86.79)    Surgical History  History of Knee surgery  History of Tonsillectomy    Family History  Family history of Anxiety : Sister  Family history of alcoholism (V17.0) (Z81.1) : Mother  Family history of atrial fibrillation (V17.49) (Z82.49) : Mother  Family history of bipolar disorder (V17.0) (Z81.8) : Brother  Family history of diabetes mellitus (V18.0) (Z83.3) : Father  Family history of hyperlipidemia (V18.19) (Z83.438) : Father  Family history of hypertension (V17.49) (Z82.49) : Father  Family history of malignant neoplasm of prostate (V16.42) (Z80.42) : Father    Social History  Has 2 children    Never a smoker  No illicit drug use  Occasional alcohol use  Occupation    Current Meds  Aspirin Low Dose 81 MG Oral Tablet Delayed Release; TAKE 1 TABLET BY MOUTH  DAILY AS DIRECTED  Metoprolol Succinate ER 25 MG Oral Tablet Extended Release 24 Hour; TAKE 1 TABLET  DAILY    Allergies  No Known Drug Allergies    Review of Systems

## 2023-04-02 NOTE — H&P ADULT - NSHPPHYSICALEXAM_GEN_ALL_CORE
Vital Signs Last 24 Hrs  T(C): 36.6 (02 Apr 2023 16:44), Max: 36.6 (02 Apr 2023 16:44)  T(F): 97.9 (02 Apr 2023 16:44), Max: 97.9 (02 Apr 2023 16:44)  HR: 51 (02 Apr 2023 20:00) (50 - 51)  BP: 142/94 (02 Apr 2023 20:00) (142/94 - 165/82)  BP(mean): 106 (02 Apr 2023 16:44) (106 - 106)  RR: 18 (02 Apr 2023 20:00) (18 - 19)  SpO2: 99% (02 Apr 2023 20:00) (99% - 99%)    Parameters below as of 02 Apr 2023 20:00  Patient On (Oxygen Delivery Method): room air      Constitutional: NAD, awake and alert, well-developed  HEENT: PERRLA, EOMI, Pharynx Clear  Neck: Supple, No JVD, No Lymphadenopathy  Respiratory: Breath sounds are clear bilaterally, No wheezing, rales or rhonchi  Cardiovascular: S1 and S2, regular rate and rhythm, no Murmurs, rubs or gallops  Gastrointestinal: Bowel Sounds present, soft, nontender. No Hepatosplenomegaly. No masses  Extremities: Without clubbing, cyanosis or edema  Vascular: 2+ peripheral pulses  Neurological: A/O x 3, no focal deficits  Musculoskeletal: FROM upper and lower extremities  Skin: No rashes

## 2023-04-02 NOTE — H&P ADULT - NSHPLABSRESULTS_GEN_ALL_CORE
.                15.5   6.62  )-----------( 231      ( 02 Apr 2023 17:32 )             45.2     04-02    141  |  110<H>  |  17  ----------------------------<  105<H>  3.9   |  28  |  0.98    Ca    8.7      02 Apr 2023 17:32  Mg     2.1     04-02    TPro  7.5  /  Alb  3.9  /  TBili  0.5  /  DBili  x   /  AST  20  /  ALT  29  /  AlkPhos  69  04-02    Troponin I, High Sensitivity (04.02.23 @ 17:32)   Troponin I, High Sensitivity Result: 6.59    EKG Sinus bradycardia with ST depression and T wave inversions in lateral chest leads              CAPILLARY BLOOD GLUCOSE

## 2023-04-02 NOTE — ED PROVIDER NOTE - CLINICAL SUMMARY MEDICAL DECISION MAKING FREE TEXT BOX
Patient with substernal chest pain radiating to the left arm. Abnormal EKG with ST segment depressions in V4, V5, and V6. History of SSS s/p pacemaker. Patient of Dr. Mar at [place]. Plan for labs and CXR to r/o ACS.

## 2023-04-02 NOTE — ED ADULT NURSE NOTE - NSIMPLEMENTINTERV_GEN_ALL_ED
Implemented All Universal Safety Interventions:  [place] to call system. Call bell, personal items and telephone within reach. Instruct patient to call for assistance. Room bathroom lighting operational. Non-slip footwear when patient is off stretcher. Physically safe environment: no spills, clutter or unnecessary equipment. Stretcher in lowest position, wheels locked, appropriate side rails in place.

## 2023-04-02 NOTE — ED PROVIDER NOTE - RESPIRATORY, MLM
"SUBJECTIVE:  Anabell Wu is a 18 y.o. male here accompanied by mother for health evaluation and Rash (Face and body)    HPI  Anabell is here due to concerns about allergies and for a screening test for tuberculosis prior to starting a camp.  There is a history of recurrent allergic reactions.  A typically involve a rash on the face and body.  He has had an episode with facial swelling.  No specific allergen has been identified.  He is scheduled for an allergy evaluation tomorrow.  He requests a refill of his EpiPen.    BRYSONs allergies, medications, history, and problem list were updated as appropriate.    Review of Systems   Constitutional: Negative for appetite change and fever.   Respiratory: Negative for wheezing.    Gastrointestinal: Negative for abdominal pain and vomiting.   Skin: Positive for rash.   Allergic/Immunologic: Positive for environmental allergies.      A comprehensive review of symptoms was completed and negative except as noted above.    OBJECTIVE:  Vital signs  Vitals:    05/23/22 1104   BP: (!) 97/59   BP Location: Right arm   Patient Position: Sitting   BP Method: Medium (Automatic)   Pulse: (!) 59   Temp: 97.8 °F (36.6 °C)   SpO2: 98%   Weight: 68.5 kg (151 lb 0.2 oz)   Height: 5' 8" (1.727 m)        Physical Exam  Constitutional:       General: He is not in acute distress.  HENT:      Right Ear: Tympanic membrane normal.      Left Ear: Tympanic membrane normal.   Cardiovascular:      Rate and Rhythm: Normal rate and regular rhythm.      Heart sounds: Normal heart sounds.   Pulmonary:      Effort: Pulmonary effort is normal.      Breath sounds: Normal breath sounds.   Abdominal:      General: Bowel sounds are normal. There is no distension.      Palpations: Abdomen is soft.      Tenderness: There is no abdominal tenderness.   Musculoskeletal:      Cervical back: Normal range of motion and neck supple.   Lymphadenopathy:      Cervical: No cervical adenopathy.   Skin:     Findings: " Rash (Non-erythematous papular rash on the face) present.          ASSESSMENT/PLAN:  Anabell was seen today for health evaluation and rash.    Diagnoses and all orders for this visit:    Allergic reaction, initial encounter  -     Nursing communication  -     EPINEPHrine (EPIPEN) 0.3 mg/0.3 mL AtIn; Inject 0.3 mLs (0.3 mg total) into the muscle once. for 1 dose    Keep appointment as scheduled with allergy    Screening-pulmonary TB  -     QUANTIFERON GOLD TB; Future         No results found for this or any previous visit (from the past 24 hour(s)).    Follow Up:  No follow-ups on file.           Breath sounds clear and equal bilaterally. Breath sounds clear and equal bilaterally. No retractions or tachypnea.

## 2023-04-02 NOTE — ED PROVIDER NOTE - CARE PLAN
1 Principal Discharge DX:	Unstable angina   Principal Discharge DX:	Unstable angina  Goal:	abnormal EKG

## 2023-04-02 NOTE — ED ADULT NURSE NOTE - OBJECTIVE STATEMENT
pt presents to ed from urgent care for evaluation of chest pain and left arm tingle and numbness x 2 days. pt has extensive cardiac hx. pt vss, a&ox4, symptoms intermittent

## 2023-04-02 NOTE — ED PROVIDER NOTE - DIFFERENTIAL DIAGNOSIS
Differential Diagnosis chest pain, arm numbness, with abnormal EKG, ST segment depression, r/o ACS, spoke with Dr. Villaseñor agreeable for admission. Spoke with cardiology, admission, trending of Troponin I.

## 2023-04-02 NOTE — ED PROVIDER NOTE - EYES, MLM
Clear bilaterally, pupils equal, round and reactive to light. Clear bilaterally, pupils equal, round and reactive to light. EOMI. Visual fileds intact x 4 quadrants.

## 2023-04-03 ENCOUNTER — TRANSCRIPTION ENCOUNTER (OUTPATIENT)
Age: 48
End: 2023-04-03

## 2023-04-03 VITALS
SYSTOLIC BLOOD PRESSURE: 137 MMHG | RESPIRATION RATE: 18 BRPM | DIASTOLIC BLOOD PRESSURE: 87 MMHG | OXYGEN SATURATION: 96 % | HEART RATE: 50 BPM

## 2023-04-03 DIAGNOSIS — I48.91 UNSPECIFIED ATRIAL FIBRILLATION: ICD-10-CM

## 2023-04-03 DIAGNOSIS — I49.5 SICK SINUS SYNDROME: ICD-10-CM

## 2023-04-03 DIAGNOSIS — R07.9 CHEST PAIN, UNSPECIFIED: ICD-10-CM

## 2023-04-03 LAB
CHOLEST SERPL-MCNC: 169 MG/DL — SIGNIFICANT CHANGE UP
HDLC SERPL-MCNC: 52 MG/DL — SIGNIFICANT CHANGE UP
LIPID PNL WITH DIRECT LDL SERPL: 99 MG/DL — SIGNIFICANT CHANGE UP
NON HDL CHOLESTEROL: 118 MG/DL — SIGNIFICANT CHANGE UP
TRIGL SERPL-MCNC: 96 MG/DL — SIGNIFICANT CHANGE UP
TROPONIN I, HIGH SENSITIVITY RESULT: 5.88 NG/L — SIGNIFICANT CHANGE UP

## 2023-04-03 PROCEDURE — 99239 HOSP IP/OBS DSCHRG MGMT >30: CPT

## 2023-04-03 PROCEDURE — 93306 TTE W/DOPPLER COMPLETE: CPT | Mod: 26

## 2023-04-03 PROCEDURE — 93010 ELECTROCARDIOGRAM REPORT: CPT

## 2023-04-03 PROCEDURE — 99223 1ST HOSP IP/OBS HIGH 75: CPT

## 2023-04-03 RX ADMIN — Medication 25 MILLIGRAM(S): at 09:09

## 2023-04-03 RX ADMIN — Medication 81 MILLIGRAM(S): at 09:09

## 2023-04-03 NOTE — CONSULT NOTE ADULT - SUBJECTIVE AND OBJECTIVE BOX
Informed patient rx has been sent to her pharmacy for calcium/vit d. She verbalizes understanding.     CHIEF COMPLAINT: Patient is a 48y old  Male who presents with a chief complaint of     HPI:  47 y/o male with PMHx of PM, Afib/atrial flutter, cardiac ablations x 2, sick sinus syndrome, s/p pacemaker placement presents to the ED from  c/o intermittent chest discomfort and left arm numbness/tingling x2 days. On DOA after watching roller coaster videos, symptoms recurred and felt he should get it checked out, since he planned vacation to [place] the next day and wanted to be sure it was safe. He regularly sees his cardiologist at [facility],  Dr. 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 No allergies to medications. Denies fevers, chills, lightheadedness, neck pain., sweats, or SOB. Not as regular in taking his medication.    Cardiology consulted for chest pain associated with left arm tingling, likely musculoskeletal. At present chest pain free. Pt. states he has been exercising at the gym - lifting weights and believes the symtoms are due to musclar strain. Cardiac enzymes are negative x 3.       Active Problems  Encounter for preventive health examination (V70.0) (Z00.00)  Afib (427.31) (I48.91)  Need for immunization against influenza (V04.81) (Z23)  Pacemaker (V45.01) (Z95.0)  Screening for viral disease (V73.99) (Z11.59)  Tetanus-diphtheria (Td) vaccination (V06.5) (Z23)    Past Medical History  History of atrial flutter (V12.59) (Z86.79)    Surgical History  History of Knee surgery  History of Tonsillectomy    Family History  Family history of Anxiety : Sister  Family history of alcoholism (V17.0) (Z81.1) : Mother  Family history of atrial fibrillation (V17.49) (Z82.49) : Mother  Family history of bipolar disorder (V17.0) (Z81.8) : Brother  Family history of diabetes mellitus (V18.0) (Z83.3) : Father  Family history of hyperlipidemia (V18.19) (Z83.438) : Father  Family history of hypertension (V17.49) (Z82.49) : Father  Family history of malignant neoplasm of prostate (V16.42) (Z80.42) : Father    Social History  Has 2 children    Never a smoker  No illicit drug use  Occasional alcohol use  Occupation    Current Meds  Aspirin Low Dose 81 MG Oral Tablet Delayed Release; TAKE 1 TABLET BY MOUTH  DAILY AS DIRECTED  Metoprolol Succinate ER 25 MG Oral Tablet Extended Release 24 Hour; TAKE 1 TABLET  DAILY    Allergies  No Known Drug Allergies    Review of Systems   (02 Apr 2023 18:44)      PAST MEDICAL / SURGICAL HISTORY:  PAST MEDICAL & SURGICAL HISTORY:  No significant past surgical history          SOCIAL HISTORY:   Alcohol: Denied  Smoking: Nonsmoker  Drug Use: Denied  Marital Status:     FAMILY HISTORY: FAMILY HISTORY:      MEDICATIONS  (STANDING):  aspirin enteric coated 81 milliGRAM(s) Oral daily  atorvastatin 20 milliGRAM(s) Oral at bedtime  metoprolol succinate ER 25 milliGRAM(s) Oral daily    MEDICATIONS  (PRN):  acetaminophen     Tablet .. 650 milliGRAM(s) Oral every 6 hours PRN Temp greater or equal to 38C (100.4F), Mild Pain (1 - 3)  aluminum hydroxide/magnesium hydroxide/simethicone Suspension 30 milliLiter(s) Oral every 4 hours PRN Dyspepsia  melatonin 3 milliGRAM(s) Oral at bedtime PRN Insomnia  ondansetron Injectable 4 milliGRAM(s) IV Push every 8 hours PRN Nausea and/or Vomiting      Allergies    Allergy Status Unknown    Intolerances        REVIEW OF SYSTEMS:  CONSTITUTIONAL: No weakness, fevers or chills  Eyes: No visual changes  NECK: No pain or stiffness  RESPIRATORY: No cough, wheezing, hemoptysis; No shortness of breath  CARDIOVASCULAR: No chest pain or palpitations  GASTROINTESTINAL: No abdominal pain. No nausea, vomiting, or hematemesis; No diarrhea or constipation. No melena or hematochezia.  GENITOURINARY: No dysuria, frequency or hematuria  NEUROLOGICAL: No numbness.  SKIN: No itching or rash  All other review of systems is negative unless indicated above    VITAL SIGNS:   Vital Signs Last 24 Hrs  T(C): 36.6 (03 Apr 2023 06:22), Max: 36.6 (02 Apr 2023 16:44)  T(F): 97.9 (03 Apr 2023 06:22), Max: 97.9 (02 Apr 2023 16:44)  HR: 50 (03 Apr 2023 09:07) (50 - 51)  BP: 137/87 (03 Apr 2023 09:07) (131/86 - 165/82)  BP(mean): 103 (03 Apr 2023 09:07) (96 - 106)  RR: 18 (03 Apr 2023 09:07) (18 - 19)  SpO2: 96% (03 Apr 2023 09:07) (96% - 99%)    Parameters below as of 03 Apr 2023 09:07  Patient On (Oxygen Delivery Method): room air        I&O's Summary      PHYSICAL EXAM:  Constitutional: NAD, awake and alert  HEENT:  EOMI,  Pupils round, No oral cyanosis.  Pulmonary: Non-labored, breath sounds are clear bilaterally, No wheezing, rales or rhonchi  Cardiovascular: S1 and S2, regular rate and rhythm, no Murmurs, gallops or rubs  Gastrointestinal: Bowel Sounds present, soft, nontender.   Lymph: No peripheral edema. No cervical lymphadenopathy.  Neurological: Alert, no focal deficits  Skin: No rashes.  Psych:  Mood & affect appropriate    LABS:                          15.5   6.62  )-----------( 231      ( 02 Apr 2023 17:32 )             45.2     04-02    141  |  110<H>  |  17  ----------------------------<  105<H>  3.9   |  28  |  0.98    Ca    8.7      02 Apr 2023 17:32  Mg     2.1     04-02    TPro  7.5  /  Alb  3.9  /  TBili  0.5  /  DBili  x   /  AST  20  /  ALT  29  /  AlkPhos  69  04-02    - TroponinI hsT: <-5.88, <-6.35, <-6.59                        15.5   6.62  )-----------( 231      ( 02 Apr 2023 17:32 )             45.2     02 Apr 2023 17:32    141    |  110    |  17     ----------------------------<  105    3.9     |  28     |  0.98     Ca    8.7        02 Apr 2023 17:32  Mg     2.1       02 Apr 2023 17:32    TPro  7.5    /  Alb  3.9    /  TBili  0.5    /  DBili  x      /  AST  20     /  ALT  29     /  AlkPhos  69     02 Apr 2023 17:32    Radiology: reviewed     < from: Xray Chest 1 View- PORTABLE-Urgent (04.02.23 @ 18:25) >    ACC: [id number] EXAM:  XR CHEST PORTABLE URGENT 1V   ORDERED BY: KELY CARRILLO     PROCEDURE DATE:  04/02/2023          INTERPRETATION:  INDICATION: Chest pain    COMPARISON: None    Technique: AP radiograph of the chest    FINDINGS:  Left chest wall dual-lead AICD with intact electrodes overlying right   atrium and right ventricle.  Heart/Vascular: Cardiomediastinal silhouette is within normal limits.  Pulmonary: Lungs are clear. No pleural effusion or pneumothorax.  Bones: Bony structures are intact.    Impression:  Clear lungs.    < end of copied text >

## 2023-04-03 NOTE — DISCHARGE NOTE NURSING/CASE MANAGEMENT/SOCIAL WORK - PATIENT PORTAL LINK FT
You can access the FollowMyHealth Patient Portal offered by [facility] by registering at the following website: [email or web address] By joining [facility]’s FollowMyHealth portal, you will also be able to view your health information using other applications (apps) compatible with our system.

## 2023-04-03 NOTE — DISCHARGE NOTE NURSING/CASE MANAGEMENT/SOCIAL WORK - NSPROEXTENSIONSOFSELF_GEN_A_NUR
[facility]  report reviewed 12/5/2022     The last fill date for oxycodone-acetaminophen was 11/1/2022 for a 5 d/s qty 30      Last UDS: 1/25/2022    CSA Last signed: not on file        PCP: Sadaf Fine MD    Last appt: 11/4/2022  No future appointments. Requested Prescriptions     Pending Prescriptions Disp Refills    oxyCODONE-acetaminophen (PERCOCET) 5-325 mg per tablet  0     Sig: Take 1 Tablet by mouth every four (4) hours as needed for Pain for up to 3 days. Max Daily Amount: 6 Tablets. none

## 2023-04-03 NOTE — PROGRESS NOTE ADULT - SUBJECTIVE AND OBJECTIVE BOX
SUBJECTIVE:    CHIEF COMPLAINT:  Patient is a 48y old  Male who presents with a chief complaint of     HPI:  47 y/o male with PMHx of PM, Afib/atrial flutter, cardiac ablations x 2, sick sinus syndrome, s/p pacemaker placement presents to the ED from  c/o intermittent chest discomfort and left arm numbness/tingling x2 days. On DOA after watching roller coaster videos, symptoms recurred and felt he should get it checked out, since he planned vacation to [place] the next day and wanted to be sure it was safe. He regularly sees his cardiologist at [facility],  Dr. 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 No allergies to medications. Denies fevers, chills, lightheadedness, neck pain., sweats, or SOB. Not as regular in taking his medication.      Active Problems  Encounter for preventive health examination (V70.0) (Z00.00)  Afib (427.31) (I48.91)  Need for immunization against influenza (V04.81) (Z23)  Pacemaker (V45.01) (Z95.0)  Screening for viral disease (V73.99) (Z11.59)  Tetanus-diphtheria (Td) vaccination (V06.5) (Z23)    Past Medical History  History of atrial flutter (V12.59) (Z86.79)    Surgical History  History of Knee surgery  History of Tonsillectomy    Family History  Family history of Anxiety : Sister  Family history of alcoholism (V17.0) (Z81.1) : Mother  Family history of atrial fibrillation (V17.49) (Z82.49) : Mother  Family history of bipolar disorder (V17.0) (Z81.8) : Brother  Family history of diabetes mellitus (V18.0) (Z83.3) : Father  Family history of hyperlipidemia (V18.19) (Z83.438) : Father  Family history of hypertension (V17.49) (Z82.49) : Father  Family history of malignant neoplasm of prostate (V16.42) (Z80.42) : Father    Social History  Has 2 children    Never a smoker  No illicit drug use  Occasional alcohol use  Occupation    Current Meds  Aspirin Low Dose 81 MG Oral Tablet Delayed Release; TAKE 1 TABLET BY MOUTH  DAILY AS DIRECTED  Metoprolol Succinate ER 25 MG Oral Tablet Extended Release 24 Hour; TAKE 1 TABLET  DAILY    Allergies  No Known Drug Allergies    Review of Systems   (02 Apr 2023 18:44)      Interval HPI and Overnight Events: Pt doing well this am. Trace left shoulder pain. negative troponins overnight. EKG changes this am but fully paced now while yesterday was sinus rhythm. Await cardiology opinion today.. Mostly paced rhythm on pacemaker interrogation with occ atrial fibrillation    REVIEW OF SYSTEMS:  CONSTITUTIONAL: No weakness, fevers or chills  EYES/ENT: No visual changes;  No vertigo or throat pain   NECK: No pain or stiffness  RESPIRATORY: No cough, wheezing, hemoptysis; No shortness of breath  CARDIOVASCULAR: No chest pain or palpitations  GASTROINTESTINAL: No abdominal or epigastric pain. No nausea, vomiting, or hematemesis; No diarrhea or constipation. No melena or hematochezia.  GENITOURINARY: No dysuria, frequency or hematuria  NEUROLOGICAL: No numbness or weakness  SKIN: No itching, burning, rashes, or lesions   All other review of systems is negative unless indicated above    OBJECTIVE    Vital Signs Last 24 Hrs  T(C): 36.6 (03 Apr 2023 06:22), Max: 36.6 (02 Apr 2023 16:44)  T(F): 97.9 (03 Apr 2023 06:22), Max: 97.9 (02 Apr 2023 16:44)  HR: 50 (03 Apr 2023 09:07) (50 - 51)  BP: 137/87 (03 Apr 2023 09:07) (131/86 - 165/82)  BP(mean): 103 (03 Apr 2023 09:07) (96 - 106)  RR: 18 (03 Apr 2023 09:07) (18 - 19)  SpO2: 96% (03 Apr 2023 09:07) (96% - 99%)    Parameters below as of 03 Apr 2023 09:07  Patient On (Oxygen Delivery Method): room air    MEDICATIONS  (STANDING):  aspirin enteric coated 81 milliGRAM(s) Oral daily  atorvastatin 20 milliGRAM(s) Oral at bedtime  metoprolol succinate ER 25 milliGRAM(s) Oral daily      LABS:                         15.5   6.62  )-----------( 231      ( 02 Apr 2023 17:32 )             45.2     04-02    141  |  110<H>  |  17  ----------------------------<  105<H>  3.9   |  28  |  0.98    Ca    8.7      02 Apr 2023 17:32  Mg     2.1     04-02    TPro  7.5  /  Alb  3.9  /  TBili  0.5  /  DBili  x   /  AST  20  /  ALT  29  /  AlkPhos  69  04-02    Troponin negative x2. Await #30

## 2023-04-03 NOTE — PROGRESS NOTE ADULT - ASSESSMENT
47 y/o male with PMHx of PM, Afib/atrial flutter, cardiac ablations x 2, sick sinus syndrome, s/p pacemaker placement presents to the ED from  c/o intermittent chest discomfort and left arm numbness/tingling x2 days    Assessment:  Chest Discomfort and shoulder discomfort with EKG changes - R/o ACS - less likely  SSS  Pacemaker pacing this am  Bradycardia    Plan:  Continue telemetry  Serial CE's and EKG's  Await Cardiology consult  Add statin   Continue beta blocker  Continue ASA  Ambulate today  May need nuclear stress testing - in vs out patient?

## 2023-04-03 NOTE — DISCHARGE NOTE PROVIDER - CARE PROVIDER_API CALL
Bryce Villaseñor)  Family Medicine  [address], [address] [address]  Phone: [phone]  Fax: [phone]  Follow Up Time:     Aaron Pack)  Cardiology  [address] [place] [address]  Phone: [phone]  Fax: [phone]  Follow Up Time:

## 2023-04-03 NOTE — DISCHARGE NOTE PROVIDER - NSDCMRMEDTOKEN_GEN_ALL_CORE_FT
aspirin 81 mg oral capsule: 1 cap(s) orally once a day  metoprolol succinate 25 mg oral tablet, extended release: 1 tab(s) orally once a day

## 2023-04-03 NOTE — DISCHARGE NOTE NURSING/CASE MANAGEMENT/SOCIAL WORK - NSDCPEFALRISK_GEN_ALL_CORE
Patient Education     Chlamydia Urethritis, Treated (Male)  You have an infection in the channel in the penis that carries urine (the urethra). The infection is caused by the bacteria chlamydia. This infection is a sexually transmitted disease (STD). It is highly contagious. It's spread by sexual contact with an infected partner.     Symptoms most often begin within 1 week after you come in contact with the bacteria. But it may take 3 weeks for symptoms to show up. You may have a watery discharge from the penis and burning during urination.   This infection can be treated and cured. Treatment is with antibiotic medicine.  Home care  Follow these guidelines when caring for yourself at home:  · Your sexual partner needs to be treated even if he or she has no symptoms. Your partner should contact his or her own healthcare provider. Or your partner can go to an urgent care clinic or your local health department to be examined and treated.  · Avoid sexual activity until both you and your partner have finished all antibiotic medicine. You should wait until your provider tells you that you are no longer contagious.  · Take all antibiotic medicine as directed until it is finished. If you stop the medicine before you have finished it, symptoms may come back.  · Learn about safe sex practices and use these in the future. The safest sex is with a partner who has tested negative and has sex only with you. Condoms can help stop spreading some STDs. These include gonorrhea, chlamydia, and HIV. But condoms are not a guarantee.  Follow-up care  Follow up with your health care provider, or as advised. If a culture test was taken, you may call as advised for the results. You should have another culture test 4 to 6 weeks after treatment. This is to make sure the infection is gone. Call your provider or your local health department for complete STD screening. This includes HIV testing. Call the CDC National STD Hotline at 506-458-8430  for more information about STDs.  When to seek medical advice  Call your health care provider right away if any of these occur:  · You don’t get better after 3 days of treatment  · You can’t urinate because of the pain  · Rash or joint pain  · Painful sores on the penis  · Enlarged painful lumps (lymph nodes) in the groin  · Testicle pain or swelling of the scrotum  · Fever of 100.4°F (38°C) or above, lasting for 24 to 48 hours  · Blood in urine   Date Last Reviewed: 1/1/2017 © 2000-2018 [employer]. [address] [place] [address]. All rights reserved. This information is not intended as a substitute for professional medical care. Always follow your healthcare professional's instructions.            For information on Fall & Injury Prevention, visit: [email or web address] OR  [email or web address] OR  https://www.cdc.gov/steadi/patient.html

## 2023-04-03 NOTE — DISCHARGE NOTE PROVIDER - NSDCACTIVITY_GEN_ALL_CORE
Bathing allowed/Do not drive or operate machinery/Do not make important decisions/Stairs allowed/No heavy lifting/straining/Walking - Outdoors allowed

## 2023-04-03 NOTE — PROCEDURE NOTE - INTERROGATION NOTE: COMMENTS
Normal functioning dual chamber PPM with battery longevity 5.4 years. SR with A-pacing 84%, V-pacing 81%, PAF burden <1% for 1 year. Most recent PAF was on  3/22/23 for 5 min. rate response turned on.

## 2023-04-03 NOTE — CONSULT NOTE ADULT - PROBLEM SELECTOR RECOMMENDATION 2
s/p PPM - interrogated by EP -shows low Afib burden - <1% per year. Most recent PAF was on  3/22/23 for 5 min.

## 2023-04-03 NOTE — CONSULT NOTE ADULT - PROBLEM SELECTOR RECOMMENDATION 3
Hx of Afib, ow NSR, AV paced, PPM interrogation shows low Afib burden - <1% per year. Most recent PAF was on  3/22/23 for 5 min. CHADsVASc=1 (HTN), no on AC, cont. BB Hx of Afib, ow NSR, AV paced, PPM interrogation shows low Afib burden - <1% per year. Most recent PAF was on  3/22/23 for 5 min. CHADsVASc=1 (HTN), not on AC, cont. BB

## 2023-04-03 NOTE — DISCHARGE NOTE PROVIDER - HOSPITAL COURSE
Pt admitted to telemetry. Cardiac enzymes remained negative. Echo was fine. Pacemaker shows intermittent a fib but not in the last 4 days. Cardiology recommended nuclear stress test but pt eager to leave. Cardiology agreed it would be reasonable to do as an out patient. Advised no strenuous activity until stress test.

## 2023-04-03 NOTE — CONSULT NOTE ADULT - PROBLEM SELECTOR RECOMMENDATION 9
Patient unable to leave unit 2:30pm.  That's when room-mate is home.   chest pain with EKG changes and negative trops x 3, atypical chest pain due to musculoskeletal vs. ACS  at present chest pain free   Recommendation: check Echo and nuclear stress test to r/o ischemia (pt. planning to go to [facility] on vacation),  cont. asa and BB chest pain with EKG changes and negative trops x 3, atypical chest pain due to musculoskeletal vs. ACS  at present chest pain free   Recommendation: check Echo - reviewed by cardiology - no acute findings,  nuclear stress test recommended however pt. refused - will do outpatient with his cardiologist at [facility]  cont. asa and BB

## 2023-04-03 NOTE — CONSULT NOTE ADULT - NS ATTEND AMEND GEN_ALL_CORE FT
Agree w above.  Chest pain noncardiac by history. Trops neg.  Reviewed ECG in ED T wave inversions in V4-V6.  Other ECGs paced.  Given intermittent pacing T wave inversions likely due to paced rhythm  but small chance obstructive disease is present.  no prior stress testing per pt.  Offered lexiscan stress test as IP tommorrow  for further evaluation.  Pt prefers to followup w/ his cardiologist/EP physician.  OK to d/c & scheduled stress test in 1 week.

## 2023-04-03 NOTE — DISCHARGE NOTE PROVIDER - NSDCCPCAREPLAN_GEN_ALL_CORE_FT
PRINCIPAL DISCHARGE DIAGNOSIS  Diagnosis: Chest pain in adult  Assessment and Plan of Treatment:       SECONDARY DISCHARGE DIAGNOSES  Diagnosis: Afib  Assessment and Plan of Treatment:     Diagnosis: SSS (sick sinus syndrome)  Assessment and Plan of Treatment:

## 2023-04-07 ENCOUNTER — NON-APPOINTMENT (OUTPATIENT)
Age: 48
End: 2023-04-07

## 2023-04-07 DIAGNOSIS — Z86.79 PERSONAL HISTORY OF OTHER DISEASES OF THE CIRCULATORY SYSTEM: ICD-10-CM

## 2023-04-07 DIAGNOSIS — R20.0 ANESTHESIA OF SKIN: ICD-10-CM

## 2023-04-07 DIAGNOSIS — I48.91 UNSPECIFIED ATRIAL FIBRILLATION: ICD-10-CM

## 2023-04-07 DIAGNOSIS — R07.9 CHEST PAIN, UNSPECIFIED: ICD-10-CM

## 2023-04-07 DIAGNOSIS — Z95.810 PRESENCE OF AUTOMATIC (IMPLANTABLE) CARDIAC DEFIBRILLATOR: ICD-10-CM

## 2023-04-07 DIAGNOSIS — I49.5 SICK SINUS SYNDROME: ICD-10-CM

## 2023-04-07 DIAGNOSIS — Z79.82 LONG TERM (CURRENT) USE OF ASPIRIN: ICD-10-CM

## 2023-04-07 DIAGNOSIS — Z20.822 CONTACT WITH AND (SUSPECTED) EXPOSURE TO COVID-19: ICD-10-CM

## 2023-04-07 DIAGNOSIS — Z90.89 ACQUIRED ABSENCE OF OTHER ORGANS: ICD-10-CM

## 2023-06-07 ENCOUNTER — APPOINTMENT (OUTPATIENT)
Dept: FAMILY MEDICINE | Facility: CLINIC | Age: 48
End: 2023-06-07
Payer: COMMERCIAL

## 2023-06-07 VITALS
OXYGEN SATURATION: 97 % | BODY MASS INDEX: 26.18 KG/M2 | DIASTOLIC BLOOD PRESSURE: 64 MMHG | SYSTOLIC BLOOD PRESSURE: 110 MMHG | WEIGHT: 204 LBS | HEART RATE: 51 BPM | TEMPERATURE: 98.4 F | HEIGHT: 74 IN

## 2023-06-07 DIAGNOSIS — M62.830 MUSCLE SPASM OF BACK: ICD-10-CM

## 2023-06-07 PROBLEM — I48.92 UNSPECIFIED ATRIAL FLUTTER: Chronic | Status: ACTIVE | Noted: 2023-04-19

## 2023-06-07 PROCEDURE — 99213 OFFICE O/P EST LOW 20 MIN: CPT

## 2023-06-07 RX ORDER — METHOCARBAMOL 500 MG/1
500 TABLET, FILM COATED ORAL EVERY 4 HOURS
Qty: 18 | Refills: 0 | Status: ACTIVE | COMMUNITY
Start: 2023-06-07 | End: 1900-01-01

## 2023-06-07 NOTE — PLAN
[FreeTextEntry1] : Appropriate counseling provided. \par May take Aleve with muscle relaxer to see if improvement noted.  Caution with usage of muscle relaxer discussed. \par Recommended to follow-up if any worsening noted.

## 2023-06-07 NOTE — HISTORY OF PRESENT ILLNESS
[FreeTextEntry8] : ELIECER HUSSEIN is a 48 year old male presenting with left sided upper back muscle spasm. \par States he noted it occurred about 2 days prior.  Went to the chiropractor for an adjustment felt that it was worsened.  Here today for assessment and possible muscle relaxer.  States he does do push-ups daily and this can occur every now and then. \par Has tried Aleve with minimal relief.  Warm or cold packs seem to help.  No pain with radiation no numbness or tingling, no chest pain.\par

## 2024-01-10 NOTE — PATIENT PROFILE ADULT - FUNCTIONAL ASSESSMENT - DAILY ACTIVITY SECTION LABEL
NEW [facility] REFERRAL URGENT     Referred to Neurology     DX: Migraine, post concussion    .

## 2024-08-26 NOTE — ED PROVIDER NOTE - NS_EDPROVIDERDISPOUSERTYPE_ED_A_ED
Chief Complaint   Patient presents with    Labs Only     Blood drawn via VPT by LPN.      YUMIKO Walton LPN    Scribe Attestation (For Scribes USE Only)... Attending Attestation (For Attendings USE Only).../Scribe Attestation (For Scribes USE Only)...

## 2024-10-02 NOTE — ED PROVIDER NOTE - OBJECTIVE STATEMENT
.        47 y/o male with PMHx of PM, Afib/atrial flutter, cardiac ablations x2, sick sinus syndrome, s/p pacemaker placement presents to the ED from  c/o chest discomfort and left arm numbness/tingling x2 days. Yesterday patient felt normal, but symptoms resumed today after watching roller coaster videos. Patient with planned vacation to [place] tomorrow. He regularly sees his cardiologist Dr. 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 No allergies to medications. Denies fevers, chills, lightheadedness, neck pain.   Cardiologist: Dr. Mar

## 2025-03-08 ENCOUNTER — APPOINTMENT (OUTPATIENT)
Dept: FAMILY MEDICINE | Facility: CLINIC | Age: 50
End: 2025-03-08
Payer: COMMERCIAL

## 2025-03-08 ENCOUNTER — NON-APPOINTMENT (OUTPATIENT)
Age: 50
End: 2025-03-08

## 2025-03-08 VITALS
DIASTOLIC BLOOD PRESSURE: 90 MMHG | OXYGEN SATURATION: 99 % | BODY MASS INDEX: 26.56 KG/M2 | SYSTOLIC BLOOD PRESSURE: 142 MMHG | TEMPERATURE: 98.5 F | HEART RATE: 71 BPM | WEIGHT: 207 LBS | HEIGHT: 74 IN

## 2025-03-08 DIAGNOSIS — Z11.59 ENCOUNTER FOR SCREENING FOR OTHER VIRAL DISEASES: ICD-10-CM

## 2025-03-08 DIAGNOSIS — M62.830 MUSCLE SPASM OF BACK: ICD-10-CM

## 2025-03-08 DIAGNOSIS — Z95.0 PRESENCE OF CARDIAC PACEMAKER: ICD-10-CM

## 2025-03-08 DIAGNOSIS — Z00.00 ENCOUNTER FOR GENERAL ADULT MEDICAL EXAMINATION W/OUT ABNORMAL FINDINGS: ICD-10-CM

## 2025-03-08 DIAGNOSIS — Z92.29 PERSONAL HISTORY OF OTHER DRUG THERAPY: ICD-10-CM

## 2025-03-08 DIAGNOSIS — I48.91 UNSPECIFIED ATRIAL FIBRILLATION: ICD-10-CM

## 2025-03-08 DIAGNOSIS — Z23 ENCOUNTER FOR IMMUNIZATION: ICD-10-CM

## 2025-03-08 LAB
BILIRUB UR QL STRIP: NORMAL
CLARITY UR: CLEAR
COLLECTION METHOD: NORMAL
GLUCOSE UR-MCNC: NORMAL
HCG UR QL: 1 EU/DL
HGB UR QL STRIP.AUTO: NORMAL
KETONES UR-MCNC: NORMAL
LEUKOCYTE ESTERASE UR QL STRIP: NORMAL
NITRITE UR QL STRIP: NORMAL
PH UR STRIP: 7
PROT UR STRIP-MCNC: NORMAL
SP GR UR STRIP: 1.01

## 2025-03-08 PROCEDURE — 92551 PURE TONE HEARING TEST AIR: CPT

## 2025-03-08 PROCEDURE — 93000 ELECTROCARDIOGRAM COMPLETE: CPT

## 2025-03-08 PROCEDURE — 81003 URINALYSIS AUTO W/O SCOPE: CPT | Mod: QW

## 2025-03-08 PROCEDURE — 99173 VISUAL ACUITY SCREEN: CPT

## 2025-03-08 PROCEDURE — 99396 PREV VISIT EST AGE 40-64: CPT

## 2025-03-08 PROCEDURE — 36415 COLL VENOUS BLD VENIPUNCTURE: CPT

## 2025-03-09 LAB
ALBUMIN SERPL ELPH-MCNC: 4.4 G/DL
ALP BLD-CCNC: 71 U/L
ALT SERPL-CCNC: 16 U/L
ANION GAP SERPL CALC-SCNC: 10 MMOL/L
AST SERPL-CCNC: 17 U/L
BASOPHILS # BLD AUTO: 0.03 K/UL
BASOPHILS NFR BLD AUTO: 0.6 %
BILIRUB SERPL-MCNC: 1 MG/DL
BUN SERPL-MCNC: 16 MG/DL
CALCIUM SERPL-MCNC: 9.4 MG/DL
CHLORIDE SERPL-SCNC: 106 MMOL/L
CHOLEST SERPL-MCNC: 189 MG/DL
CO2 SERPL-SCNC: 26 MMOL/L
CREAT SERPL-MCNC: 1 MG/DL
EGFRCR SERPLBLD CKD-EPI 2021: 92 ML/MIN/1.73M2
EOSINOPHIL # BLD AUTO: 0.08 K/UL
EOSINOPHIL NFR BLD AUTO: 1.7 %
GLUCOSE SERPL-MCNC: 97 MG/DL
HCT VFR BLD CALC: 46.7 %
HDLC SERPL-MCNC: 49 MG/DL
HGB BLD-MCNC: 15.3 G/DL
IMM GRANULOCYTES NFR BLD AUTO: 0.4 %
LDLC SERPL CALC-MCNC: 123 MG/DL
LYMPHOCYTES # BLD AUTO: 1.49 K/UL
LYMPHOCYTES NFR BLD AUTO: 31.2 %
MAN DIFF?: NORMAL
MCHC RBC-ENTMCNC: 29.7 PG
MCHC RBC-ENTMCNC: 32.8 G/DL
MCV RBC AUTO: 90.5 FL
MONOCYTES # BLD AUTO: 0.33 K/UL
MONOCYTES NFR BLD AUTO: 6.9 %
NEUTROPHILS # BLD AUTO: 2.82 K/UL
NEUTROPHILS NFR BLD AUTO: 59.2 %
NONHDLC SERPL-MCNC: 140 MG/DL
PLATELET # BLD AUTO: 234 K/UL
POTASSIUM SERPL-SCNC: 4.7 MMOL/L
PROT SERPL-MCNC: 6.7 G/DL
RBC # BLD: 5.16 M/UL
RBC # FLD: 12.7 %
SODIUM SERPL-SCNC: 141 MMOL/L
T3FREE SERPL-MCNC: 3.1 PG/ML
T4 FREE SERPL-MCNC: 1.3 NG/DL
TESTOST SERPL-MCNC: 440 NG/DL
TRIGL SERPL-MCNC: 93 MG/DL
TSH SERPL-ACNC: 1.12 UIU/ML
WBC # FLD AUTO: 4.77 K/UL

## 2025-03-20 ENCOUNTER — APPOINTMENT (OUTPATIENT)
Dept: FAMILY MEDICINE | Facility: CLINIC | Age: 50
End: 2025-03-20
Payer: COMMERCIAL

## 2025-03-20 DIAGNOSIS — R03.0 ELEVATED BLOOD-PRESSURE READING, W/OUT DIAGNOSIS OF HYPERTENSION: ICD-10-CM

## 2025-03-20 DIAGNOSIS — E78.5 HYPERLIPIDEMIA, UNSPECIFIED: ICD-10-CM

## 2025-03-20 DIAGNOSIS — I48.91 UNSPECIFIED ATRIAL FIBRILLATION: ICD-10-CM

## 2025-03-20 PROCEDURE — 99213 OFFICE O/P EST LOW 20 MIN: CPT | Mod: 95
